# Patient Record
Sex: MALE | Race: WHITE | Employment: UNEMPLOYED | URBAN - METROPOLITAN AREA
[De-identification: names, ages, dates, MRNs, and addresses within clinical notes are randomized per-mention and may not be internally consistent; named-entity substitution may affect disease eponyms.]

---

## 2017-09-27 PROCEDURE — 84153 ASSAY OF PSA TOTAL: CPT | Performed by: FAMILY MEDICINE

## 2021-03-12 PROCEDURE — 88305 TISSUE EXAM BY PATHOLOGIST: CPT | Performed by: INTERNAL MEDICINE

## 2021-05-18 PROBLEM — M17.11 PRIMARY OSTEOARTHRITIS OF RIGHT KNEE: Status: ACTIVE | Noted: 2021-05-18

## 2021-05-18 PROBLEM — M17.12 PRIMARY OSTEOARTHRITIS OF LEFT KNEE: Status: ACTIVE | Noted: 2021-05-18

## 2023-09-17 ENCOUNTER — HOSPITAL ENCOUNTER (EMERGENCY)
Facility: HOSPITAL | Age: 53
Discharge: HOME | End: 2023-09-17
Attending: EMERGENCY MEDICINE
Payer: COMMERCIAL

## 2023-09-17 ENCOUNTER — APPOINTMENT (OUTPATIENT)
Dept: GENERAL RADIOLOGY | Facility: HOSPITAL | Age: 53
End: 2023-09-17
Attending: EMERGENCY MEDICINE
Payer: COMMERCIAL

## 2023-09-17 VITALS
HEIGHT: 71 IN | DIASTOLIC BLOOD PRESSURE: 71 MMHG | RESPIRATION RATE: 18 BRPM | SYSTOLIC BLOOD PRESSURE: 179 MMHG | WEIGHT: 280 LBS | HEART RATE: 100 BPM | TEMPERATURE: 99 F | BODY MASS INDEX: 39.2 KG/M2 | OXYGEN SATURATION: 97 %

## 2023-09-17 DIAGNOSIS — S80.01XA CONTUSION OF RIGHT KNEE, INITIAL ENCOUNTER: Primary | ICD-10-CM

## 2023-09-17 PROCEDURE — 73560 X-RAY EXAM OF KNEE 1 OR 2: CPT | Performed by: EMERGENCY MEDICINE

## 2023-09-17 PROCEDURE — 99284 EMERGENCY DEPT VISIT MOD MDM: CPT

## 2023-09-17 NOTE — DISCHARGE INSTRUCTIONS
Ice intermittently to the affected area, 20 minutes on and 20 minutes off. Weightbearing as tolerated. You may resume driving when you feel comfortable enough to be able to bend the right knee to change from accelerator to brake. You may take prescription naproxen 1 tablet  twice daily. Alternatively you may take 2 Aleve over-the-counter twice daily or ibuprofen 600 to 800 mg every 6-8 hours. Take with food.

## 2023-09-17 NOTE — ED INITIAL ASSESSMENT (HPI)
Pt. Windy Laser and fell last night and landed on right knee, pain to right knee, difficulty walking and bending leg.

## 2025-04-04 ENCOUNTER — OFFICE VISIT (OUTPATIENT)
Dept: ORTHOPEDICS CLINIC | Facility: CLINIC | Age: 55
End: 2025-04-04
Payer: COMMERCIAL

## 2025-04-04 ENCOUNTER — HOSPITAL ENCOUNTER (OUTPATIENT)
Dept: GENERAL RADIOLOGY | Age: 55
Discharge: HOME | End: 2025-04-04
Payer: COMMERCIAL

## 2025-04-04 VITALS — WEIGHT: 280 LBS | BODY MASS INDEX: 39.2 KG/M2 | HEIGHT: 71 IN

## 2025-04-04 DIAGNOSIS — M25.562 LEFT KNEE PAIN, UNSPECIFIED CHRONICITY: ICD-10-CM

## 2025-04-04 DIAGNOSIS — M17.0 PRIMARY OSTEOARTHRITIS OF KNEES, BILATERAL: Primary | ICD-10-CM

## 2025-04-04 DIAGNOSIS — M25.561 RIGHT KNEE PAIN, UNSPECIFIED CHRONICITY: ICD-10-CM

## 2025-04-04 PROCEDURE — 99204 OFFICE O/P NEW MOD 45 MIN: CPT

## 2025-04-04 PROCEDURE — 73564 X-RAY EXAM KNEE 4 OR MORE: CPT

## 2025-04-04 PROCEDURE — 20610 DRAIN/INJ JOINT/BURSA W/O US: CPT

## 2025-04-04 RX ORDER — TRIAMCINOLONE ACETONIDE 40 MG/ML
80 INJECTION, SUSPENSION INTRA-ARTICULAR; INTRAMUSCULAR ONCE
Status: COMPLETED | OUTPATIENT
Start: 2025-04-04 | End: 2025-04-04

## 2025-04-04 RX ORDER — PANTOPRAZOLE SODIUM 40 MG/1
40 TABLET, DELAYED RELEASE ORAL DAILY
COMMUNITY

## 2025-04-04 RX ADMIN — TRIAMCINOLONE ACETONIDE 80 MG: 40 INJECTION, SUSPENSION INTRA-ARTICULAR; INTRAMUSCULAR at 10:15:00

## 2025-04-04 NOTE — PROGRESS NOTES
[facility] New Patient Note         The following individual(s) verbally consented to be recorded using ambient AI listening technology and understand that they can each withdraw their consent to this listening technology at any point by asking the clinician to turn off or pause the recording:    Patient name: Pietro Adhikari      CC:   Chief Complaint   Patient presents with    Knee Pain     Bi-Lateral Knee Pain, Right knee is worse  Onset: Many Years  Pain Score; Right: 6, Left knee 4       History of Present Illness  Pietro Adhikari is a 54 year old male who presents with chronic bilateral knee pain.    He has been experiencing chronic knee pain for approximately ten years, primarily located under the kneecap and associated with a known diagnosis of osteoarthritis, as indicated by previous x-rays.The pain is intermittent, with episodes of shooting pain occurring even while sitting. It is particularly uncomfortable when trying to sleep or when standing for extended periods, such as in Buddhism. Walking is painful, affecting his ability to exercise.    Last year, he consulted with a physician who administered a cortisone injection and drained fluid from his knee, which provided significant relief. However, he was unable to continue with physical therapy due to insurance costs at the time. He now has new insurance and is seeking further treatment options. The last steroid injection was in October, and it provided excellent relief for about four months.    He reports that the knee sometimes swells up and gets stiff.  Denies any feelings of weakness or instability.  Denies any numbness or tingling sensation in the legs.  He is here today for further evaluation.    Past Medical History:    Depression    Gout    HYPERLIPIDEMIA    Obstructive sleep apnea (adult) (pediatric)    AHI 9 RDI 9 REM AHI 36 SaO2 soham 86%     Past Surgical History:   Procedure Laterality Date    Colonoscopy N/A 3/12/2021    Procedure:  COLONOSCOPY, POSSIBLE BIOPSY, POSSIBLE POLYPECTOMY 53964;  Surgeon: Marc Teague MD;  Location: [facility], [facility]    Ct heart w/ calcium scoring  2/18/10    MCAI 1.    Tonsillectomy  1973     Current Outpatient Medications   Medication Sig Dispense Refill    ALLOPURINOL 300 MG Oral Tab Take 1 tablet (300 mg total) by mouth daily. 90 tablet 0    ATORVASTATIN 20 MG Oral Tab TAKE 1 TABLET BY MOUTH ONE TIME DAILY 90 tablet 0    CITALOPRAM 20 MG Oral Tab Take 1 tablet (20 mg total) by mouth once daily. 90 tablet 0    aspirin 81 MG Oral Chew Tab Chew 1 tablet (81 mg total) by mouth daily.      pantoprazole 40 MG Oral Tab EC Take 1 tablet (40 mg total) by mouth daily.       Allergies[1]  Family History   Adopted: Yes     Social History     Occupational History    Occupation: [employer]     Comment: [employer]   Tobacco Use    Smoking status: Former     Current packs/day: 0.00     Types: Cigarettes     Quit date: 1982     Years since quittin.2    Smokeless tobacco: Former     Types: Chew     Quit date: 2014   Vaping Use    Vaping status: Never Used   Substance and Sexual Activity    Alcohol use: Yes     Comment: couple weekly    Drug use: No    Sexual activity: Yes     Partners: Female        ROS:  Comprehensive system review obtained and negative except as mentioned above    Physical Exam:      Physical Exam      Ht 5' 11\" (1.803 m)   Wt 280 lb (127 kg)   BMI 39.05 kg/m²   Constitutional: Awake, alert, no distress.  Very pleasant and conversational  Psychological: Appropriate affect.  Respiratory: Unlabored breathing.  Bilateral lower extremity:  Inspection: skin is intact without any redness or deformity.  Trace effusion.   Palpation: Mild tenderness to palpation about the medial and lateral joint lines bilaterally.  Mild tenderness palpation over the mid patella bilaterally.  No tenderness palpation over the distal quad tendons.  No appreciable quad atrophy.  Range of motion: Knee can  extend to 10 degrees short of full and flex to approximately 120 degrees.  Knee is stable to valgus and varus stress at 0 and 30 degrees. No laxity with anterior or posterior drawer.  Negative Juancarlos and Steinmann test.  No calf pain or tenderness.  Negative Homans' sign.  Neuromuscular: Strength is normal and sensation is intact.  Vascular: Extremities are warm and well-perfused.  Lymph: Unremarkable.    Imaging: Imaging was personally viewed, independently interpreted and radiology report read. They show:   XR KNEE, COMPLETE (4 OR MORE VIEWS), LEFT (CPT=73564)    Result Date: 4/4/2025  CONCLUSION:  Moderate osteoarthritic changes are present. No acute fracture or other acute bony process.   LOCATION:  [facility]   Dictated by (CST): Jose Juan Villa MD on 4/04/2025 at 10:04 AM     Finalized by (CST): Jose Juan Villa MD on 4/04/2025 at 10:08 AM       XR KNEE, COMPLETE (4 OR MORE VIEWS), RIGHT (CPT=73564)    Result Date: 4/4/2025  CONCLUSION:  Mild osteoarthritic changes are present. No acute fracture or other acute bony process.   LOCATION:  [facility]   Dictated by (CST): Jose Juan Villa MD on 4/04/2025 at 10:03 AM     Finalized by (CST): Jose Juan Villa MD on 4/04/2025 at 10:03 AM         Results      Assessment & Plan  Assessment: 54-year-old male with moderate primary osteoarthritis of bilateral knees    Plan: I discussed the etiology, natural history, and management options for symptomatic knee osteoarthritis.  I discussed nonsurgical and surgical treatments, with nonsurgical treatments to include anti-inflammatory medications, injections, activity modification, weight loss, low impact exercise and possible therapy.  Surgery would be with knee replacement and is an elective operation reserved for when nonsurgical treatments no longer alleviate symptoms sufficiently.  At this time, the patient is experiencing pain and is interested in doing steroid injections into the bilateral knees again which would be reasonable.   Please separate procedure note for additional details.  We also had a lengthy discussion about alternative injection options and recommended trying viscosupplementation injections in the future as he has never had these in the past.  He is interested in this and would like me to place authorization at today's visit.  Authorization for bilateral knee Durolane injections were placed today's visit and advised our office will call him once approved by insurance and he can schedule an injection appointment at that time.  Encouraged him to participate in formal physical therapy to work on conditioning the knees which he is in agreement with.  An internal prescription for physical therapy was placed at today's visit and he will call to schedule soon.  He will follow-up with me for HA injections when approved and ready. All questions and concerns were addressed and answered to the patient's satisfaction. They are in agreement with the treatment plan going forward.         PEYMAN Freeman, PA-C  Orthopedic Surgery   t: [phone]  f: [phone]           This document was partially prepared using Dragon Medical voice recognition software.  Although every attempt is made to correct errors during dictation, discrepancies may still exist. Please contact me with any questions or clarifications.         [1]   Allergies  Allergen Reactions    Levaquin SWELLING

## 2025-04-04 NOTE — PROCEDURES
Risks and benefits of knee injection discussed with the patient, with risks including but not limited to pain and swelling at the injection site and/or within the knee joint, infection, elevation in blood pressure and/or glucose levels, facial flushing. After informed consent, the patient's right and left knees were marked, locally anesthetized with skin refrigerant, prepped with topical antiseptic, and injected with a mixture of 1mL 40mg/mL Kenalog, 2mL 1% lidocaine and 2mL 0.5% marcaine through the inferolateral portal.  A band-aid was applied.  The patient tolerated the procedure well.      PEYMAN Freeman, PAGiovanaC  Orthopedic Surgery   t: [phone]  f: [phone]           This document was partially prepared using Dragon Medical voice recognition software.  Although every attempt is made to correct errors during dictation, discrepancies may still exist. Please contact me with any questions or clarifications.

## 2025-04-09 ENCOUNTER — TELEPHONE (OUTPATIENT)
Dept: ORTHOPEDICS CLINIC | Facility: CLINIC | Age: 55
End: 2025-04-09

## 2025-05-06 ENCOUNTER — TELEPHONE (OUTPATIENT)
Facility: CLINIC | Age: 55
End: 2025-05-06

## 2025-05-06 NOTE — TELEPHONE ENCOUNTER
Patient scheduled on [facility] for YESIKA gel injection.    Future Appointments   Date Time Provider Department Center   5/16/2025  7:40 AM Lori Lemus PA-C [facility]     Please advise.

## 2025-05-07 NOTE — TELEPHONE ENCOUNTER
Patient authorized visco to be scheduled:    DOS:5/16/25  PROVIDER:sanjay  MEDICATION:sophy lopez  OFFICE LOCATION:[facility]  PULLED:yes  LABELED:yes  PLACED:- in med cabinet

## 2025-05-16 ENCOUNTER — OFFICE VISIT (OUTPATIENT)
Dept: ORTHOPEDICS CLINIC | Facility: CLINIC | Age: 55
End: 2025-05-16
Payer: COMMERCIAL

## 2025-05-16 DIAGNOSIS — M17.0 PRIMARY OSTEOARTHRITIS OF KNEES, BILATERAL: Primary | ICD-10-CM

## 2025-05-16 RX ORDER — IRBESARTAN AND HYDROCHLOROTHIAZIDE 150; 12.5 MG/1; MG/1
2 TABLET, FILM COATED ORAL DAILY
COMMUNITY
Start: 2025-04-29

## 2025-05-16 NOTE — PROCEDURES
Risks and benefits of knee injection discussed with the patient, with risks including but not limited to pain and swelling at the injection site and/or within the knee joint, infection, elevation in blood pressure and/or glucose levels, facial flushing. After informed consent, the patient's right and left knees were marked, locally anesthetized with skin refrigerant, prepped with topical antiseptic, and injected with 3mL of 60mg/3mL Durolane through the inferolateral portal.  A band-aid was applied.  The patient tolerated the procedure well.      PEYMAN Freeman, PA-C  Orthopedic Surgery   t: [phone]  f: [phone]           This document was partially prepared using Dragon Medical voice recognition software.  Although every attempt is made to correct errors during dictation, discrepancies may still exist. Please contact me with any questions or clarifications.

## 2025-08-15 ENCOUNTER — OFFICE VISIT (OUTPATIENT)
Dept: ORTHOPEDICS CLINIC | Facility: CLINIC | Age: 55
End: 2025-08-15

## 2025-08-15 VITALS — WEIGHT: 280 LBS | BODY MASS INDEX: 39.2 KG/M2 | HEIGHT: 71 IN

## 2025-08-15 DIAGNOSIS — M17.0 PRIMARY OSTEOARTHRITIS OF KNEES, BILATERAL: Primary | ICD-10-CM

## 2025-08-15 DIAGNOSIS — M25.461 BILATERAL KNEE EFFUSIONS: ICD-10-CM

## 2025-08-15 DIAGNOSIS — M25.462 BILATERAL KNEE EFFUSIONS: ICD-10-CM

## 2025-08-15 PROCEDURE — 20610 DRAIN/INJ JOINT/BURSA W/O US: CPT

## 2025-08-15 RX ORDER — ATORVASTATIN CALCIUM 40 MG/1
40 TABLET, FILM COATED ORAL DAILY
COMMUNITY
Start: 2025-07-06

## 2025-08-15 RX ORDER — TRIAMCINOLONE ACETONIDE 40 MG/ML
80 INJECTION, SUSPENSION INTRA-ARTICULAR; INTRAMUSCULAR ONCE
Status: COMPLETED | OUTPATIENT
Start: 2025-08-15 | End: 2025-08-15

## 2025-08-15 RX ORDER — TRIAMCINOLONE ACETONIDE 40 MG/ML
80 INJECTION, SUSPENSION INTRA-ARTICULAR; INTRAMUSCULAR ONCE
Status: SHIPPED | OUTPATIENT
Start: 2025-08-15

## 2025-08-15 RX ADMIN — TRIAMCINOLONE ACETONIDE 80 MG: 40 INJECTION, SUSPENSION INTRA-ARTICULAR; INTRAMUSCULAR at 08:22:00

## 2025-08-19 ENCOUNTER — PATIENT MESSAGE (OUTPATIENT)
Dept: ORTHOPEDICS CLINIC | Facility: CLINIC | Age: 55
End: 2025-08-19

## 2025-08-19 DIAGNOSIS — M17.0 PRIMARY OSTEOARTHRITIS OF KNEES, BILATERAL: Primary | ICD-10-CM
